# Patient Record
Sex: FEMALE | Race: WHITE | Employment: FULL TIME | URBAN - METROPOLITAN AREA
[De-identification: names, ages, dates, MRNs, and addresses within clinical notes are randomized per-mention and may not be internally consistent; named-entity substitution may affect disease eponyms.]

---

## 2018-02-01 LAB
HPV COMMENT: NORMAL
HPV TYPE 16: NOT DETECTED
HPV TYPE 18: NOT DETECTED
HPVOH (OTHER TYPES): NOT DETECTED

## 2022-02-17 ENCOUNTER — PROCEDURE VISIT (OUTPATIENT)
Dept: AUDIOLOGY | Age: 57
End: 2022-02-17
Payer: COMMERCIAL

## 2022-02-17 DIAGNOSIS — H90.3 SENSORINEURAL HEARING LOSS (SNHL) OF BOTH EARS: ICD-10-CM

## 2022-02-17 DIAGNOSIS — H93.13 SUBJECTIVE TINNITUS, BILATERAL: ICD-10-CM

## 2022-02-17 PROCEDURE — 92567 TYMPANOMETRY: CPT | Performed by: AUDIOLOGIST

## 2022-02-17 PROCEDURE — 92557 COMPREHENSIVE HEARING TEST: CPT | Performed by: AUDIOLOGIST

## 2022-02-17 NOTE — PROGRESS NOTES
[facility]  Division of Audiology/Otolaryngology    2/17/2022     Patient name: Conner Rebolledo  Primary Care Physician: PABLO Dennis   Medical Record Number: [id number]     Conner Rebolleod   1965, 60 y.o. female   [id number]       Referring Provider: Jayden Mijares DO  Referral Type: In an order in [address] [place] [address]    Reason for Visit: Evaluation of suspected change in hearing, tinnitus, or balance. AUDIOLOGIC AND OTHER PERTINENT MEDICAL HISTORY:      Conner Rebolledo was seen for audiologic evaluation. Jayden Mijares DO is referral source of today's appointment. Patient presents with tinnitus. She had covid in December 2021. Since then she has noticed non-stop tinnitus. Tinnitus is bilateral. Quality is \"high-pitched cicadas. \" denies hearing concerns. ASSESSMENT AND FINDINGS:     Otoscopy revealed: Visible tympanic membrane bilaterally    RIGHT EAR:  Hearing Sensitivity: Mild-moderate sensory loss   Word Recognition: Excellent (%), based on NU-6   Tympanometry: Normal peak pressure and compliance, Type A tympanogram, consistent with normal middle ear function. Acoustic Reflexes: Ipsilateral: Present at elevated sensation levels and Absent. LEFT EAR:  Hearing Sensitivity: Mild-moderate sensory loss   Word Recognition: Excellent (%), based on NU-6   Tympanometry: Normal peak pressure with low compliance, Type As tympanogram, consistent with reduced tympanic membrane mobility. Acoustic Reflexes: Ipsilateral: Absent. Quick SIN (hearing performance in noise) assessment was administered. Conner Rebolledo scored  23/25.5, consistent with 2.5 dB signal-noise ratio loss. This suggests normal/near normal hearing during the presence of noise or complex listening enviornments. COUNSELING:        Reviewed purpose of testing completed today, general auditory system function, and results obtained today.   Patient was provided with a copy of audiogram.  Hearing in Noise Assessment reviewed. Patient counseled on impacts of hearing status with respect to background noise. Degree of   Hearing Sensitivity dB Range   Within Normal Limits (WNL) 0 - 20   Mild 20 - 40   Moderate 40 - 55   Moderately-Severe 55 - 70   Severe 70 - 90   Profound 90 +        RECOMMENDATIONS:      If you are interested in pursuing hearing aids, here are the next steps:     1. Contact your insurance and ask, Do I have a benefit for hearing aids?   ? If the answer is no hearing aids will be a 100% out of pocket expense  ? If the answer is yes, ask Can I use this benefit at [facility] ([facility])?  or Where can I use this benefit?  If [facility] is not in-network for hearing aids, your insurance should be able to provide the appropriate contact information for an in-network provider. 1. Call [facility] ([phone]) to schedule a Hearing Aid Evaluation   ? This appointment is when we will discuss hearing aid options and decide which device is most appropriate for you. Iliana Weeks DO to medically advise.       Electronically signed by Hernesto Ramos on 2/17/22 at 8:48 AM.

## 2022-02-18 ENCOUNTER — HOSPITAL ENCOUNTER (OUTPATIENT)
Dept: MAMMOGRAPHY | Age: 57
Discharge: HOME | End: 2022-02-23
Payer: COMMERCIAL

## 2022-02-18 VITALS — HEIGHT: 61 IN | WEIGHT: 199 LBS | BODY MASS INDEX: 37.57 KG/M2

## 2022-02-18 DIAGNOSIS — Z12.31 VISIT FOR SCREENING MAMMOGRAM: ICD-10-CM

## 2022-02-18 PROCEDURE — 77063 BREAST TOMOSYNTHESIS BI: CPT

## 2022-02-24 ENCOUNTER — OFFICE VISIT (OUTPATIENT)
Dept: ENT CLINIC | Age: 57
End: 2022-02-24
Payer: COMMERCIAL

## 2022-02-24 VITALS — HEART RATE: 87 BPM | TEMPERATURE: 97 F | SYSTOLIC BLOOD PRESSURE: 141 MMHG | DIASTOLIC BLOOD PRESSURE: 82 MMHG

## 2022-02-24 DIAGNOSIS — H90.3 SENSORINEURAL HEARING LOSS (SNHL) OF BOTH EARS: ICD-10-CM

## 2022-02-24 DIAGNOSIS — J34.3 HYPERTROPHY OF BOTH INFERIOR NASAL TURBINATES: ICD-10-CM

## 2022-02-24 DIAGNOSIS — H93.13 TINNITUS OF BOTH EARS: Primary | ICD-10-CM

## 2022-02-24 DIAGNOSIS — J30.9 ALLERGIC RHINITIS, UNSPECIFIED SEASONALITY, UNSPECIFIED TRIGGER: ICD-10-CM

## 2022-02-24 PROCEDURE — 99204 OFFICE O/P NEW MOD 45 MIN: CPT | Performed by: STUDENT IN AN ORGANIZED HEALTH CARE EDUCATION/TRAINING PROGRAM

## 2022-02-24 RX ORDER — CETIRIZINE HYDROCHLORIDE 10 MG/1
10 TABLET ORAL DAILY
Qty: 90 TABLET | Refills: 1 | Status: SHIPPED | OUTPATIENT
Start: 2022-02-24

## 2022-02-24 NOTE — PROGRESS NOTES
[address] [facility] SURGERY  NEW PATIENT HISTORY AND PHYSICAL NOTE      Patient Name: Noel SJose M [address] Record Number:  [id number]  Primary Care Physician:  PABLO Jones    ChiefComplaint     Chief Complaint   Patient presents with    Other     patient states she has constant ringing in both ears since she had a major cough and cold in december, review hearing test       History of Present Illness     Francesco Pizano is an 59 y.o. female presenting with ringing of both ears since Dec 2021. Sounds like chirping, non pulsatile, bilateral, occasionally worse in right ear. Worse in quiet environments, lali at night. Started when she was sick with a cough and cold, tested + for COVID. Denies recent changes in hearing. No otalgia. No otorrhea. No history of chronic ear infections. No history of otologic surgery. No family history of early onset hearing loss. No loud noise exposures. Denies exposure to chemotherapy. Denies vertigo. Reported sinus issues - nasal congestion, clear post nasal drainage. No facial pressur eor pian. No mucopurulent nasal drainage. Sense of smell intact. Never allergy tested. Has been on Flonase for a couple weeks as recommended by PCP. Will also take sudafed as needed.       Past Medical History     Past Medical History:   Diagnosis Date    Irritable bowel syndrome        Past Surgical History     Past Surgical History:   Procedure Laterality Date    CHOLECYSTECTOMY      TUBAL LIGATION         Family History     Family History   Problem Relation Age of Onset    Mult Sclerosis Mother     Cirrhosis Maternal Grandmother        Social History     Social History     Tobacco Use    Smoking status: Former Smoker     Quit date: 3/5/1997     Years since quittin.9    Smokeless tobacco: Never Used   Substance Use Topics    Alcohol use: No    Drug use: No        Allergies     No Known Allergies    Medications     Current Outpatient Medications   Medication Sig Dispense Refill    cetirizine (ZYRTEC) 10 MG tablet Take 1 tablet by mouth daily 90 tablet 1    temazepam (RESTORIL) 15 MG capsule Take 15 mg by mouth nightly as needed for Sleep      ondansetron (ZOFRAN) 4 MG tablet Take 1 tablet by mouth every 8 hours as needed for Nausea 20 tablet 0    oxybutynin (DITROPAN) 5 MG tablet Take 10 mg by mouth daily.  omeprazole (PRILOSEC) 40 MG capsule Take 1 capsule by mouth daily. 30 capsule 3    loperamide (IMODIUM) 2 MG capsule Take 2 mg by mouth 4 times daily as needed.  oxybutynin (DITROPAN) 5 MG tablet Take 1 tablet by mouth 3 times daily. 90 tablet 2    dicyclomine (BENTYL) 20 MG tablet Take 1 tablet by mouth 3 times daily as needed. 120 tablet 2     No current facility-administered medications for this visit. Review of Systems     REVIEW OF SYSTEMS  The following systems were reviewed and revealed the following in addition to any already discussed in the HPI:    CONSTITUTIONAL: no weight loss, no fever, no night sweats, no chills  EYES: no vision changes, no blurry vision  EARS: no hearing loss, no otalgia  NOSE: no epistaxis, no rhinorrhea  THROAT: No voice changes, no sore throat, no dysphagia      PhysicalExam     Vitals:    02/24/22 0910   BP: (!) 141/82   Site: Left Upper Arm   Position: Sitting   Cuff Size: Medium Adult   Pulse: 87   Temp: 97 °F (36.1 °C)   TempSrc: Temporal       PHYSICAL EXAM  BP (!) 141/82 (Site: Left Upper Arm, Position: Sitting, Cuff Size: Medium Adult)   Pulse 87   Temp 97 °F (36.1 °C) (Temporal)   LMP 07/01/2015     GENERAL: No acute distress, alert and oriented, no hoarseness  EYES: EOMI, Anti-icteric  NOSE: On anterior rhinoscopy there is no epistaxis, nasal mucosa moist and normal appearing, no purulent drainage. Bilateral inferior turbinates hypertrophied.   EARS: Normal external appearance; on portable otomicroscopy:     -Ad: External auditory canal without stenosis, tympanic membrane clear, no middle ear effusions or retractions.      -As: External auditory canal without stenosis, tympanic membrane clear, no middle ear effusions or retractions. Pneumatic otoscopy: Bilateral tympanic membranes mobile pneumatic otoscopy  FACE: HB 1/6 bilaterally, symmetric appearing, sensation equal bilaterally  ORAL CAVITY: No masses or lesions visualized or palpated, uvula is midline, moist mucous membranes, symmetric  NECK: Normal range of motion, no thyromegaly, trachea is midline, no palpable lymphadenopathy or neck masses, no crepitus  NEURO: Cranial Nerves 2, 3, 4, 5, 6, 7, 11, 12 grossly intact bilaterally     I have performed a head and neck physical exam personally or was physically present during the key or critical portions of the service. Data/Imaging Review     Comprehensive audiological evaluation performed on 2/17/2022 was independently reviewed by myself which demonstrates: Au: Mild to moderate sensorineural hearing loss with notch at 4000 Hz    WRS 96% right, WRS 96% left. Type A tympanogram right. Type As tympanogram left. Assessment and Plan     1. Tinnitus of both ears  - Unfortunately there is no good \"cure\" for tinnitus  - Discussed tinnitus masking techniques (eg. Fan running in the room, radio tuned between stations giving white noise, light music, or white noise machine or a noise generator)  --Consider hearing aids with tinnitus masking technology  - Loud noise avoidance and wearing of hearing protection when exposed  - If it gets to the point where tinnitus is severely impairing daily quality of life consider tinnitus retraining therapy or cognitive behavioral therapy for habituation     2. Sensorineural hearing loss (SNHL) of both ears  -Would receive minimal benefit from amplification for hearing loss but may benefit from hearing aids with tinnitus masking technology  - Encouraged loud noise avoidance and wearing of hearing protection when exposed.   - Recommend surveillance of hearing with comprehensive audiological evaluation in 1-2 years. 3. Allergic rhinitis, unspecified seasonality, unspecified trigger  4. Hypertrophy of both inferior nasal turbinates  -Continue daily Flonase use  -Start Zyrtec        Follow Up     Return if symptoms worsen or fail to improve. Dr. [place] [address]  Department of Otolaryngology/Head & Neck Surgery  2/24/22    Medical Decision Making: The following items were considered in medical decision making:  Independent review of images  Review / order clinical lab tests  Review / order radiology tests  Decision to obtain old records    This note was generated completely or in part utilizing Dragon dictation speech recognition software. Occasionally, words are mistranscribed and despite editing, the text may contain inaccuracies due to incorrect word recognition. If further clarification is needed please contact the office at [phone].

## 2022-03-15 RX ORDER — MECLIZINE HYDROCHLORIDE 25 MG/1
25 TABLET ORAL 3 TIMES DAILY PRN
Qty: 30 TABLET | Refills: 0 | Status: SHIPPED | OUTPATIENT
Start: 2022-03-15 | End: 2022-03-15

## 2022-03-15 RX ORDER — MECLIZINE HYDROCHLORIDE 25 MG/1
25 TABLET ORAL 3 TIMES DAILY PRN
Qty: 30 TABLET | Refills: 0 | Status: SHIPPED | OUTPATIENT
Start: 2022-03-15 | End: 2022-03-25

## 2022-03-15 NOTE — PROGRESS NOTES
Patient with recent vertigo in the setting of tinnitus per my chart message. Will send in for prescription of meclizine which she has taken in the past which seems to help.

## 2023-06-15 ENCOUNTER — HOSPITAL ENCOUNTER (OUTPATIENT)
Dept: MAMMOGRAPHY | Age: 58
Discharge: HOME | End: 2023-06-20

## 2023-06-15 DIAGNOSIS — Z12.31 VISIT FOR SCREENING MAMMOGRAM: ICD-10-CM

## 2025-02-28 ENCOUNTER — HOSPITAL ENCOUNTER (OUTPATIENT)
Dept: MAMMOGRAPHY | Age: 60
Discharge: HOME | End: 2025-02-28

## 2025-02-28 VITALS — HEIGHT: 61 IN | BODY MASS INDEX: 26.43 KG/M2 | WEIGHT: 140 LBS

## 2025-02-28 DIAGNOSIS — Z12.31 VISIT FOR SCREENING MAMMOGRAM: ICD-10-CM

## 2025-02-28 PROCEDURE — 77063 BREAST TOMOSYNTHESIS BI: CPT

## 2025-02-28 PROCEDURE — 77067 SCR MAMMO BI INCL CAD: CPT
